# Patient Record
Sex: FEMALE | Race: WHITE | NOT HISPANIC OR LATINO | URBAN - METROPOLITAN AREA
[De-identification: names, ages, dates, MRNs, and addresses within clinical notes are randomized per-mention and may not be internally consistent; named-entity substitution may affect disease eponyms.]

---

## 2020-02-25 PROBLEM — 195967001 ASTHMA: Status: ACTIVE | Noted: 2020-02-25

## 2020-02-25 PROBLEM — 34000006 CROHN'S DISEASE: Status: ACTIVE | Noted: 2020-02-25

## 2020-06-17 ENCOUNTER — OFFICE VISIT (OUTPATIENT)
Dept: URBAN - METROPOLITAN AREA SURGERY CENTER 27 | Facility: SURGERY CENTER | Age: 60
End: 2020-06-17

## 2020-07-17 ENCOUNTER — OFFICE VISIT (OUTPATIENT)
Dept: URBAN - METROPOLITAN AREA CLINIC 46 | Facility: CLINIC | Age: 60
End: 2020-07-17

## 2020-07-17 LAB — PDFREPORT1: (no result)

## 2020-07-19 ENCOUNTER — LAB OUTSIDE AN ENCOUNTER (OUTPATIENT)
Dept: URBAN - METROPOLITAN AREA CLINIC 13 | Facility: CLINIC | Age: 60
End: 2020-07-19

## 2020-07-21 ENCOUNTER — OFFICE VISIT (OUTPATIENT)
Dept: URBAN - METROPOLITAN AREA CLINIC 46 | Facility: CLINIC | Age: 60
End: 2020-07-21

## 2020-07-24 ENCOUNTER — OFFICE VISIT (OUTPATIENT)
Dept: URBAN - METROPOLITAN AREA SURGERY CENTER 27 | Facility: SURGERY CENTER | Age: 60
End: 2020-07-24

## 2020-07-24 LAB — PDFREPORT1: (no result)

## 2020-07-28 ENCOUNTER — LAB OUTSIDE AN ENCOUNTER (OUTPATIENT)
Dept: URBAN - METROPOLITAN AREA CLINIC 13 | Facility: CLINIC | Age: 60
End: 2020-07-28

## 2020-07-29 ENCOUNTER — OFFICE VISIT (OUTPATIENT)
Dept: URBAN - METROPOLITAN AREA CLINIC 13 | Facility: CLINIC | Age: 60
End: 2020-07-29

## 2021-08-28 ENCOUNTER — TELEPHONE ENCOUNTER (OUTPATIENT)
Dept: URBAN - METROPOLITAN AREA CLINIC 13 | Facility: CLINIC | Age: 61
End: 2021-08-28

## 2021-08-29 ENCOUNTER — TELEPHONE ENCOUNTER (OUTPATIENT)
Dept: URBAN - METROPOLITAN AREA CLINIC 13 | Facility: CLINIC | Age: 61
End: 2021-08-29

## 2021-08-29 RX ORDER — MESALAMINE 1.2 G/1
TABLET, DELAYED RELEASE ORAL
Status: ACTIVE | COMMUNITY
Start: 2017-10-17

## 2021-08-29 RX ORDER — PROGESTERONE 50 MG/ML
INJECTION, SOLUTION INTRAMUSCULAR
Status: ACTIVE | COMMUNITY

## 2021-08-29 RX ORDER — ESTRADIOL 0.5 MG/1
TABLET ORAL
Status: ACTIVE | COMMUNITY

## 2021-11-19 ENCOUNTER — TELEPHONE ENCOUNTER (OUTPATIENT)
Dept: URBAN - METROPOLITAN AREA CLINIC 44 | Facility: CLINIC | Age: 61
End: 2021-11-19

## 2021-11-19 RX ORDER — MESALAMINE 1.2 G/1
2 TABLETS TABLET, DELAYED RELEASE ORAL ONCE A DAY
Qty: 60 | Refills: 2
Start: 2017-10-17

## 2021-12-07 ENCOUNTER — CLAIMS CREATED FROM THE CLAIM WINDOW (OUTPATIENT)
Dept: URBAN - METROPOLITAN AREA CLINIC 48 | Facility: CLINIC | Age: 61
End: 2021-12-07
Payer: COMMERCIAL

## 2021-12-07 VITALS
HEART RATE: 77 BPM | SYSTOLIC BLOOD PRESSURE: 120 MMHG | DIASTOLIC BLOOD PRESSURE: 82 MMHG | WEIGHT: 161 LBS | TEMPERATURE: 97.5 F | BODY MASS INDEX: 29.63 KG/M2 | HEIGHT: 62 IN

## 2021-12-07 DIAGNOSIS — K63.5 POLYP OF COLON: ICD-10-CM

## 2021-12-07 DIAGNOSIS — K64.1 SECOND DEGREE HEMORRHOIDS: ICD-10-CM

## 2021-12-07 DIAGNOSIS — K57.30 DVRTCLOS OF LG INT W/O PERFORATION OR ABSCESS W/O BLEEDING: ICD-10-CM

## 2021-12-07 DIAGNOSIS — K50.10 CROHN'S DISEASE OF COLON WITHOUT COMPLICATION: ICD-10-CM

## 2021-12-07 PROBLEM — 398050005 DIVERTICULAR DISEASE OF COLON: Status: ACTIVE | Noted: 2021-12-07

## 2021-12-07 PROCEDURE — 99214 OFFICE O/P EST MOD 30 MIN: CPT | Performed by: NURSE PRACTITIONER

## 2021-12-07 PROCEDURE — 99214 OFFICE O/P EST MOD 30 MIN: CPT | Performed by: INTERNAL MEDICINE

## 2021-12-07 RX ORDER — ESTRADIOL 0.5 MG/1
TABLET ORAL
Status: ACTIVE | COMMUNITY

## 2021-12-07 RX ORDER — PROGESTERONE 50 MG/ML
INJECTION, SOLUTION INTRAMUSCULAR
Status: ACTIVE | COMMUNITY

## 2021-12-07 RX ORDER — MESALAMINE 1.2 G/1
2 TABLETS TABLET, DELAYED RELEASE ORAL ONCE A DAY
Qty: 180 TABLET | Refills: 2 | OUTPATIENT

## 2021-12-07 RX ORDER — MESALAMINE 1.2 G/1
2 TABLETS TABLET, DELAYED RELEASE ORAL ONCE A DAY
Qty: 60 | Refills: 2 | Status: ACTIVE | COMMUNITY
Start: 2017-10-17

## 2021-12-07 NOTE — HPI-TODAY'S VISIT:
61 year old female with a family history of polyps and IBD, dx with Crohn's in 2015 and path (dx in cecum). She had a colon 4/29/15 that shows chronic colitis consistent with Crohn's. Pathology confirmed active colitis. She is on Lialda and Align. She is having intermittent constipation and is having to take a probiotic and Miralax at times. Denies blood in stool, abdominal pain, and her weight has been steady. Last colonoscopy was in 7/2020 and showed moderate left sided diverticulosis, sigmoid hyperplastic polyp,  and internal hemorrhoids, otherwise, normal. Recent labwork 11/23/21 was at [facility] (we do not have report). Her father had Crohn's.

## 2023-02-28 ENCOUNTER — CLAIMS CREATED FROM THE CLAIM WINDOW (OUTPATIENT)
Dept: URBAN - METROPOLITAN AREA CLINIC 48 | Facility: CLINIC | Age: 63
End: 2023-02-28
Payer: COMMERCIAL

## 2023-02-28 VITALS
HEIGHT: 62 IN | HEART RATE: 87 BPM | WEIGHT: 163.4 LBS | SYSTOLIC BLOOD PRESSURE: 114 MMHG | BODY MASS INDEX: 30.07 KG/M2 | OXYGEN SATURATION: 97 % | TEMPERATURE: 97.5 F | DIASTOLIC BLOOD PRESSURE: 80 MMHG

## 2023-02-28 DIAGNOSIS — R14.0 ABDOMINAL BLOATING: ICD-10-CM

## 2023-02-28 DIAGNOSIS — K64.1 SECOND DEGREE HEMORRHOIDS: ICD-10-CM

## 2023-02-28 DIAGNOSIS — K63.5 POLYP OF COLON: ICD-10-CM

## 2023-02-28 DIAGNOSIS — K50.10 CROHN'S DISEASE OF COLON WITHOUT COMPLICATION: ICD-10-CM

## 2023-02-28 DIAGNOSIS — Z83.71 FAMILY HISTORY OF COLON POLYPS: ICD-10-CM

## 2023-02-28 PROBLEM — 68496003 POLYP OF COLON: Status: ACTIVE | Noted: 2021-12-07

## 2023-02-28 PROBLEM — 429969003 FAMILY HISTORY OF POLYP OF COLON: Status: ACTIVE | Noted: 2023-02-28

## 2023-02-28 PROBLEM — 116289008: Status: ACTIVE | Noted: 2023-02-28

## 2023-02-28 PROBLEM — 721704005 SECOND DEGREE HEMORRHOIDS: Status: ACTIVE | Noted: 2021-12-07

## 2023-02-28 PROBLEM — 50440006: Status: ACTIVE | Noted: 2021-12-07

## 2023-02-28 PROCEDURE — 99213 OFFICE O/P EST LOW 20 MIN: CPT | Performed by: INTERNAL MEDICINE

## 2023-02-28 PROCEDURE — 99213 OFFICE O/P EST LOW 20 MIN: CPT | Performed by: NURSE PRACTITIONER

## 2023-02-28 RX ORDER — ESTRADIOL 0.5 MG/1
1 TABLET TABLET ORAL ONCE A DAY
Status: ACTIVE | COMMUNITY

## 2023-02-28 RX ORDER — MESALAMINE 1.2 G/1
2 TABLETS TABLET, DELAYED RELEASE ORAL ONCE A DAY
Qty: 180 TABLET | Refills: 2 | Status: ACTIVE | COMMUNITY

## 2023-02-28 RX ORDER — MESALAMINE 1.2 G/1
2 TABLETS TABLET, DELAYED RELEASE ORAL ONCE A DAY
Qty: 180 TABLET | Refills: 2
End: 2023-11-25

## 2023-02-28 RX ORDER — ESTRADIOL 10 UG/1
1 TABLET INSERT VAGINAL
Status: ACTIVE | COMMUNITY

## 2023-02-28 RX ORDER — PROGESTERONE 200 MG/1
AS DIRECTED CAPSULE ORAL TWICE A DAY
Status: ACTIVE | COMMUNITY

## 2023-02-28 NOTE — PHYSICAL EXAM CHEST:
chest wall non-tender, breathing is unlabored without accessory muscle use, normal breath sounds
RF bone pathology
none

## 2023-02-28 NOTE — HPI-TODAY'S VISIT:
62 year old female with a family history of polyps and IBD, dx with Crohn's in 2015 and path (dx in cecum). She had a colon 4/29/15 that shows chronic colitis consistent with Crohn's. Pathology confirmed active colitis. Last colon in 7/2020 showed diverticulosis, Grade 2 internal hemorrhoids, and a hyperplastic polyp, otherwise normal mucosa. Currently, she continues Lialda. She has intermittent bloating despite probiotic. Bowel movements are every other day of formed stool. She takes Miralax prn. Denies blood in stool, abdominal pain, and her weight has been steady. Her father had Crohn's.

## 2023-03-29 ENCOUNTER — LAB OUTSIDE AN ENCOUNTER (OUTPATIENT)
Dept: URBAN - METROPOLITAN AREA CLINIC 46 | Facility: CLINIC | Age: 63
End: 2023-03-29

## 2023-03-30 LAB
A/G RATIO: 1.9
ALBUMIN: 4.2
ALKALINE PHOSPHATASE: 55
ALT (SGPT): 12
AST (SGOT): 13
BILIRUBIN, TOTAL: 0.3
BUN/CREATININE RATIO: 20
BUN: 15
CALCIUM: 8.7
CARBON DIOXIDE, TOTAL: 26
CHLORIDE: 108
CREATININE: 0.76
EGFR: 89
GLOBULIN, TOTAL: 2.2
GLUCOSE: 95
HEMATOCRIT: 45.2
HEMOGLOBIN: 15.1
MCH: 30.7
MCHC: 33.4
MCV: 92
NRBC: (no result)
PLATELETS: 231
POTASSIUM: 4
PROTEIN, TOTAL: 6.4
RBC: 4.92
RDW: 12.5
SODIUM: 145
WBC: 4.7

## 2023-08-29 ENCOUNTER — OFFICE VISIT (OUTPATIENT)
Dept: URBAN - METROPOLITAN AREA CLINIC 48 | Facility: CLINIC | Age: 63
End: 2023-08-29
Payer: COMMERCIAL

## 2023-08-29 VITALS
SYSTOLIC BLOOD PRESSURE: 120 MMHG | BODY MASS INDEX: 29.96 KG/M2 | HEIGHT: 62 IN | TEMPERATURE: 97.7 F | DIASTOLIC BLOOD PRESSURE: 78 MMHG | HEART RATE: 94 BPM | WEIGHT: 162.8 LBS

## 2023-08-29 DIAGNOSIS — Z83.71 FAMILY HISTORY OF COLON POLYPS: ICD-10-CM

## 2023-08-29 DIAGNOSIS — K50.10 CROHN'S DISEASE OF COLON WITHOUT COMPLICATION: ICD-10-CM

## 2023-08-29 DIAGNOSIS — K63.5 POLYP OF COLON: ICD-10-CM

## 2023-08-29 DIAGNOSIS — R14.0 ABDOMINAL BLOATING: ICD-10-CM

## 2023-08-29 DIAGNOSIS — K64.1 SECOND DEGREE HEMORRHOIDS: ICD-10-CM

## 2023-08-29 PROCEDURE — 99213 OFFICE O/P EST LOW 20 MIN: CPT | Performed by: INTERNAL MEDICINE

## 2023-08-29 RX ORDER — MESALAMINE 1.2 G/1
2 TABLETS TABLET, DELAYED RELEASE ORAL ONCE A DAY
Qty: 180 TABLET | Refills: 2 | Status: ACTIVE | COMMUNITY
End: 2023-11-25

## 2023-08-29 RX ORDER — ESTRADIOL 0.5 MG/1
1 TABLET TABLET ORAL ONCE A DAY
Status: ACTIVE | COMMUNITY

## 2023-08-29 RX ORDER — ESTRADIOL 10 UG/1
1 TABLET INSERT VAGINAL
Status: ACTIVE | COMMUNITY

## 2023-08-29 RX ORDER — RIFAXIMIN 550 MG/1
1 TABLET TABLET ORAL THREE TIMES A DAY
Qty: 42 TABLET | Refills: 0 | OUTPATIENT
Start: 2023-08-30 | End: 2023-09-29

## 2023-08-29 RX ORDER — MESALAMINE 1.2 G/1
2 TABLETS TABLET, DELAYED RELEASE ORAL ONCE A DAY
Qty: 180 TABLET | Refills: 2

## 2023-08-29 RX ORDER — SULFAMETHOXAZOLE AND TRIMETHOPRIM 800; 160 MG/1; MG/1
1 TABLET TABLET ORAL TWICE A DAY
Qty: 20 | OUTPATIENT
Start: 2023-08-29 | End: 2023-09-08

## 2023-08-29 RX ORDER — PROGESTERONE 200 MG/1
AS DIRECTED CAPSULE ORAL TWICE A DAY
Status: ACTIVE | COMMUNITY

## 2023-08-29 NOTE — HPI-TODAY'S VISIT:
63 year old female with a family history of polyps and IBD, dx with Crohn's in 2015 and path (dx in cecum). She had a colon 4/29/15 that shows chronic colitis consistent with Crohn's. Pathology confirmed active colitis. Last colon in 7/2020 showed diverticulosis, Grade 2 internal hemorrhoids, and a hyperplastic polyp, otherwise normal mucosa. Currently, she continues Lialda. She has intermittent bloating despite probiotic. Bowel movements are every 2-3 days of formed stool. She takes Miralax prn and probiotics daily. Lactose causes abdominal bloating. Recently, she has had increased gas. Denies recent antibiotics. Denies blood in stool, abdominal pain, and her weight has been steady. Her father had Crohn's.

## 2023-08-31 ENCOUNTER — TELEPHONE ENCOUNTER (OUTPATIENT)
Dept: URBAN - METROPOLITAN AREA CLINIC 44 | Facility: CLINIC | Age: 63
End: 2023-08-31

## 2023-09-11 ENCOUNTER — WEB ENCOUNTER (OUTPATIENT)
Dept: URBAN - METROPOLITAN AREA CLINIC 44 | Facility: CLINIC | Age: 63
End: 2023-09-11

## 2023-10-18 ENCOUNTER — LAB OUTSIDE AN ENCOUNTER (OUTPATIENT)
Dept: URBAN - METROPOLITAN AREA CLINIC 46 | Facility: CLINIC | Age: 63
End: 2023-10-18

## 2023-10-19 LAB
A/G RATIO: 1.7
ALBUMIN: 4
ALKALINE PHOSPHATASE: 53
ALT (SGPT): 12
AST (SGOT): 13
BILIRUBIN, TOTAL: 0.5
BUN/CREATININE RATIO: (no result)
BUN: 13
CALCIUM: 8.8
CARBON DIOXIDE, TOTAL: 27
CHLORIDE: 108
CREATININE: 0.76
EGFR: 89
GLOBULIN, TOTAL: 2.4
GLUCOSE: 91
HEMATOCRIT: 45.6
HEMOGLOBIN: 15
MCH: 30.6
MCHC: 32.9
MCV: 93.1
MPV: 10.4
PLATELET COUNT: 229
POTASSIUM: 4
PROTEIN, TOTAL: 6.4
RDW: 12.1
RED BLOOD CELL COUNT: 4.9
SODIUM: 142
WHITE BLOOD CELL COUNT: 5.1

## 2024-02-26 ENCOUNTER — LAB (OUTPATIENT)
Dept: URBAN - METROPOLITAN AREA CLINIC 48 | Facility: CLINIC | Age: 64
End: 2024-02-26

## 2024-02-26 ENCOUNTER — OV EP (OUTPATIENT)
Dept: URBAN - METROPOLITAN AREA CLINIC 48 | Facility: CLINIC | Age: 64
End: 2024-02-26

## 2024-02-26 VITALS
SYSTOLIC BLOOD PRESSURE: 129 MMHG | BODY MASS INDEX: 30.36 KG/M2 | HEIGHT: 62 IN | DIASTOLIC BLOOD PRESSURE: 86 MMHG | TEMPERATURE: 98.2 F | WEIGHT: 165 LBS | HEART RATE: 82 BPM

## 2024-02-26 DIAGNOSIS — K63.5 POLYP OF COLON: ICD-10-CM

## 2024-02-26 DIAGNOSIS — Z83.79 FAMILY HISTORY OF CROHN'S DISEASE: ICD-10-CM

## 2024-02-26 DIAGNOSIS — K64.1 SECOND DEGREE HEMORRHOIDS: ICD-10-CM

## 2024-02-26 DIAGNOSIS — K50.10 CROHN'S DISEASE OF COLON WITHOUT COMPLICATION: ICD-10-CM

## 2024-02-26 PROBLEM — 160386006: Status: ACTIVE | Noted: 2024-02-26

## 2024-02-26 RX ORDER — ESTRADIOL 0.5 MG/1
1 TABLET TABLET ORAL ONCE A DAY
Status: ACTIVE | COMMUNITY

## 2024-02-26 RX ORDER — MESALAMINE 1.2 G/1
2 TABLETS TABLET, DELAYED RELEASE ORAL ONCE A DAY
Qty: 180 TABLET | Refills: 2
End: 2024-11-22

## 2024-02-26 RX ORDER — PROGESTERONE 200 MG/1
AS DIRECTED CAPSULE ORAL TWICE A DAY
Status: ACTIVE | COMMUNITY

## 2024-02-26 RX ORDER — ESTRADIOL 10 UG/1
1 TABLET INSERT VAGINAL
Status: ACTIVE | COMMUNITY

## 2024-02-26 RX ORDER — MESALAMINE 1.2 G/1
2 TABLETS TABLET, DELAYED RELEASE ORAL ONCE A DAY
Qty: 180 TABLET | Refills: 2 | Status: ACTIVE | COMMUNITY

## 2024-02-26 NOTE — HPI-TODAY'S VISIT:
63 year old female with a family history of polyps and IBD, dx with Crohn's in 2015 and path (dx in cecum) presents for follow up. CBC and CMP 10/2023 were normal. At her last visit, Xifaxan was prescribed for abdominal bloaitng, and since completing it, she is improved. Last colon in 7/2020 showed diverticulosis, Grade 2 internal hemorrhoids, and a hyperplastic polyp, otherwise normal mucosa. Currently, she continues Lialda. Bowel movements are every 2-3 days of formed stool. She takes Miralax prn and probiotics daily. Lactose causes abdominal bloating. Denies blood in stool, abdominal pain, and her weight has been steady. Her father had Crohn's.

## 2024-08-16 ENCOUNTER — CLAIMS CREATED FROM THE CLAIM WINDOW (OUTPATIENT)
Dept: URBAN - METROPOLITAN AREA CLINIC 4 | Facility: CLINIC | Age: 64
End: 2024-08-16
Payer: COMMERCIAL

## 2024-08-16 ENCOUNTER — OFFICE VISIT (OUTPATIENT)
Dept: URBAN - METROPOLITAN AREA SURGERY CENTER 27 | Facility: SURGERY CENTER | Age: 64
End: 2024-08-16
Payer: COMMERCIAL

## 2024-08-16 DIAGNOSIS — D12.5 ADENOMATOUS POLYP OF SIGMOID COLON: ICD-10-CM

## 2024-08-16 DIAGNOSIS — Z86.010 ADENOMAS PERSONAL HISTORY OF COLONIC POLYPS: ICD-10-CM

## 2024-08-16 DIAGNOSIS — Z12.11 COLON CANCER SCREENING: ICD-10-CM

## 2024-08-16 DIAGNOSIS — K63.89 BACTERIAL OVERGROWTH SYNDROME: ICD-10-CM

## 2024-08-16 DIAGNOSIS — K51.80 CHRONIC PANCOLONIC ULCERATIVE COLITIS: ICD-10-CM

## 2024-08-16 DIAGNOSIS — K63.5 POLYP OF COLON: ICD-10-CM

## 2024-08-16 DIAGNOSIS — K51.80 OTHER ULCERATIVE COLITIS WITHOUT COMPLICATIONS: ICD-10-CM

## 2024-08-16 DIAGNOSIS — K63.89 OTHER SPECIFIED DISEASES OF INTESTINE: ICD-10-CM

## 2024-08-16 DIAGNOSIS — K63.5 BENIGN COLON POLYP: ICD-10-CM

## 2024-08-16 PROCEDURE — 00812 ANES LWR INTST SCR COLSC: CPT | Performed by: NURSE ANESTHETIST, CERTIFIED REGISTERED

## 2024-08-16 PROCEDURE — 45380 COLONOSCOPY AND BIOPSY: CPT | Performed by: INTERNAL MEDICINE

## 2024-08-16 PROCEDURE — 88305 TISSUE EXAM BY PATHOLOGIST: CPT | Performed by: PATHOLOGY

## 2024-08-16 RX ORDER — MESALAMINE 1.2 G/1
2 TABLETS TABLET, DELAYED RELEASE ORAL ONCE A DAY
Qty: 180 TABLET | Refills: 2 | Status: ACTIVE | COMMUNITY
End: 2024-11-22

## 2024-08-16 RX ORDER — MESALAMINE 1.2 G/1
2 TABLETS TABLET, DELAYED RELEASE ORAL ONCE A DAY
Qty: 180 TABLET | Refills: 0 | OUTPATIENT

## 2024-08-16 RX ORDER — ESTRADIOL 0.5 MG/1
1 TABLET TABLET ORAL ONCE A DAY
Status: ACTIVE | COMMUNITY

## 2024-08-16 RX ORDER — ESTRADIOL 10 UG/1
1 TABLET INSERT VAGINAL
Status: ACTIVE | COMMUNITY

## 2024-08-16 RX ORDER — PROGESTERONE 200 MG/1
AS DIRECTED CAPSULE ORAL TWICE A DAY
Status: ACTIVE | COMMUNITY

## 2024-08-28 ENCOUNTER — TELEPHONE ENCOUNTER (OUTPATIENT)
Dept: URBAN - METROPOLITAN AREA CLINIC 46 | Facility: CLINIC | Age: 64
End: 2024-08-28

## 2025-01-30 ENCOUNTER — WEB ENCOUNTER (OUTPATIENT)
Dept: URBAN - METROPOLITAN AREA CLINIC 44 | Facility: CLINIC | Age: 65
End: 2025-01-30

## 2025-04-21 ENCOUNTER — DASHBOARD ENCOUNTERS (OUTPATIENT)
Age: 65
End: 2025-04-21

## 2025-04-22 ENCOUNTER — OFFICE VISIT (OUTPATIENT)
Dept: URBAN - METROPOLITAN AREA CLINIC 48 | Facility: CLINIC | Age: 65
End: 2025-04-22
Payer: COMMERCIAL

## 2025-04-22 ENCOUNTER — OFFICE VISIT (OUTPATIENT)
Dept: URBAN - METROPOLITAN AREA CLINIC 44 | Facility: CLINIC | Age: 65
End: 2025-04-22

## 2025-04-22 DIAGNOSIS — K57.90 DIVERTICULOSIS: ICD-10-CM

## 2025-04-22 DIAGNOSIS — K50.10 CROHN'S DISEASE OF COLON WITHOUT COMPLICATION: ICD-10-CM

## 2025-04-22 PROBLEM — 397881000: Status: ACTIVE | Noted: 2025-04-22

## 2025-04-22 PROCEDURE — 99214 OFFICE O/P EST MOD 30 MIN: CPT | Performed by: INTERNAL MEDICINE

## 2025-04-22 RX ORDER — MESALAMINE 1.2 G/1
2 TABLETS TABLET, DELAYED RELEASE ORAL ONCE A DAY
Qty: 180 | Refills: 1 | Status: ACTIVE | COMMUNITY

## 2025-04-22 RX ORDER — ESTRADIOL 0.5 MG/1
1 TABLET TABLET ORAL ONCE A DAY
Status: ACTIVE | COMMUNITY

## 2025-04-22 RX ORDER — ESTRADIOL 10 UG/1
1 TABLET INSERT VAGINAL
Status: ACTIVE | COMMUNITY

## 2025-04-22 RX ORDER — PROGESTERONE 200 MG/1
AS DIRECTED CAPSULE ORAL TWICE A DAY
Status: ACTIVE | COMMUNITY

## 2025-04-22 RX ORDER — MESALAMINE 1.2 G/1
2 TABLETS WITH A MEAL TABLET, DELAYED RELEASE ORAL ONCE A DAY
Qty: 180 TABLET | Refills: 1 | OUTPATIENT
Start: 2025-04-22

## 2025-04-22 NOTE — HPI-TODAY'S VISIT:
63 year old female with a family history of polyps and IBD, dx with Crohn's in 2015 and path (dx in cecum) presents for follow up. CBC and CMP 9/2024 were normal. 2/19/25 vitamin D was low. Since starting "greens" drink, she is having a bowel movement daily. She continues Lialda 2.4 gm daily. Last colon in 8/2024 and showed diverticulosis, Grade 2 internal hemorrhoids, and a hyperplastic polyp, otherwise normal mucosa. Lactose causes abdominal bloating. She has been on Xifaxan in the past for bloating. Denies blood in stool, abdominal pain, and her weight has been steady. Her father had Crohn's.